# Patient Record
Sex: MALE | Employment: FULL TIME | URBAN - METROPOLITAN AREA
[De-identification: names, ages, dates, MRNs, and addresses within clinical notes are randomized per-mention and may not be internally consistent; named-entity substitution may affect disease eponyms.]

---

## 2019-08-21 ENCOUNTER — OFFICE VISIT (OUTPATIENT)
Dept: ORTHOPEDICS | Facility: CLINIC | Age: 59
End: 2019-08-21
Payer: COMMERCIAL

## 2019-08-21 VITALS
HEART RATE: 51 BPM | BODY MASS INDEX: 30.22 KG/M2 | HEIGHT: 66 IN | SYSTOLIC BLOOD PRESSURE: 138 MMHG | DIASTOLIC BLOOD PRESSURE: 80 MMHG | WEIGHT: 188 LBS

## 2019-08-21 DIAGNOSIS — M51.36 DISC DEGENERATION, LUMBAR: ICD-10-CM

## 2019-08-21 DIAGNOSIS — M47.816 FACET ARTHROPATHY, LUMBAR: ICD-10-CM

## 2019-08-21 DIAGNOSIS — M47.812 SPONDYLOSIS OF CERVICAL REGION WITHOUT MYELOPATHY OR RADICULOPATHY: Primary | ICD-10-CM

## 2019-08-21 DIAGNOSIS — M47.812 FACET ARTHROPATHY, CERVICAL: ICD-10-CM

## 2019-08-21 PROCEDURE — 3008F BODY MASS INDEX DOCD: CPT | Mod: S$GLB,,, | Performed by: ORTHOPAEDIC SURGERY

## 2019-08-21 PROCEDURE — 3008F PR BODY MASS INDEX (BMI) DOCUMENTED: ICD-10-PCS | Mod: S$GLB,,, | Performed by: ORTHOPAEDIC SURGERY

## 2019-08-21 PROCEDURE — 99204 OFFICE O/P NEW MOD 45 MIN: CPT | Mod: S$GLB,,, | Performed by: ORTHOPAEDIC SURGERY

## 2019-08-21 PROCEDURE — 99204 PR OFFICE/OUTPT VISIT, NEW, LEVL IV, 45-59 MIN: ICD-10-PCS | Mod: S$GLB,,, | Performed by: ORTHOPAEDIC SURGERY

## 2019-08-21 RX ORDER — BACLOFEN 10 MG/1
10 TABLET ORAL 3 TIMES DAILY PRN
Refills: 0 | COMMUNITY
Start: 2019-07-24 | End: 2019-08-21

## 2019-08-21 RX ORDER — ROSUVASTATIN CALCIUM 40 MG/1
40 TABLET, COATED ORAL DAILY
Refills: 3 | COMMUNITY
Start: 2019-07-12

## 2019-08-21 RX ORDER — TAMSULOSIN HYDROCHLORIDE 0.4 MG/1
CAPSULE ORAL
Refills: 3 | COMMUNITY
Start: 2019-06-25

## 2019-08-21 RX ORDER — NAPROXEN SODIUM 220 MG/1
TABLET, FILM COATED ORAL
COMMUNITY

## 2019-08-21 NOTE — PROGRESS NOTES
Subjective:       Patient ID: Ernesto Proctor is a 59 y.o. male.    Chief Complaint: Pain of the Neck (Neck pain x years. Pain radiates to shoulders) and Pain of the Lumbar Spine (Lumbar pain x 2 years. Pain does not radiate. Limited standing. He has had MRI of neck and Lumbar done at [facility])      History of Present Illness  Long history of chronic neck and low back pains. Neck pain is present 17 years back pain 2 years no trauma no radiation no bowel or bladder dysfunction symptoms are daily intermittent activity related. He is undergone physical therapy and chiropractic treatments he did have injections on the left side of his low back pain year ago which didn't help incidentally his back pain is primarily right-sided neck pain primarily left-sided.    Current Medications  Current Outpatient Medications   Medication Sig Dispense Refill    aspirin 81 MG Chew Baby Aspirin 81 mg chewable tablet   Chew 1 tablet every day by oral route.      omega 3-dha-epa-fish oil (FISH OIL) 100-160-1,000 mg Cap Fish Oil      rosuvastatin (CRESTOR) 40 MG Tab Take 40 mg by mouth once daily.  3    tamsulosin (FLOMAX) 0.4 mg Cap TAKE 1 CAPSULE BY MOUTH ONCE DAILY AT BEDTIME FOR PROSTATE  3     No current facility-administered medications for this visit.        Allergies  Review of patient's allergies indicates:   Allergen Reactions    Sulfa (sulfonamide antibiotics)        Past Medical History  Past Medical History:   Diagnosis Date    Hyperlipemia        Surgical History  History reviewed. No pertinent surgical history.    Family History:   History reviewed. No pertinent family history.    Social History:   Social History     Socioeconomic History    Marital status:      Spouse name: Not on file    Number of children: Not on file    Years of education: Not on file    Highest education level: Not on file   Occupational History    Not on file   Social Needs    Financial resource  strain: Not on file    Food insecurity:     Worry: Not on file     Inability: Not on file    Transportation needs:     Medical: Not on file     Non-medical: Not on file   Tobacco Use    Smoking status: Former Smoker    Smokeless tobacco: Never Used   Substance and Sexual Activity    Alcohol use: Not on file    Drug use: Not on file    Sexual activity: Not on file   Lifestyle    Physical activity:     Days per week: Not on file     Minutes per session: Not on file    Stress: Not on file   Relationships    Social connections:     Talks on phone: Not on file     Gets together: Not on file     Attends Tenriism service: Not on file     Active member of club or organization: Not on file     Attends meetings of clubs or organizations: Not on file     Relationship status: Not on file   Other Topics Concern    Not on file   Social History Narrative    Not on file       Hospitalization/Major Diagnostic Procedure:     Review of Systems     General/Constitutional:  Chills denies. Fatigue denies. Fever denies. Weight gain denies. Weight loss denies.    Respiratory:  Shortness of breath denies.    Cardiovascular:  Chest pain denies.    Gastrointestinal:  Constipation denies. Diarrhea denies. Nausea denies. Vomiting denies.     Hematology:  Easy bruising denies. Prolonged bleeding denies.     Genitourinary:  Frequent urination denies. Pain in lower back denies. Painful urination denies.     Musculoskeletal:  See HPI for details    Skin:  Rash denies.    Neurologic:  Dizziness denies. Gait abnormalities denies. Seizures denies. Tingling/Numbess denies.    Psychiatric:  Anxiety denies. Depressed mood denies.     Objective:   Vital Signs:   Vitals:    08/21/19 0905   BP: 138/80   Pulse: (!) 51        Physical Exam      General Examination:     Constitutional: The patient is alert and oriented to lace person and time. Mood is pleasant.     Head/Face: Normal facial features normal eyebrows    Eyes: Normal extraocular  motion bilaterally    Lungs: Respirations are equal and unlabored    Gait is coordinated.    Cardiovascular: There are no swelling or varicosities present.    Lymphatic: Negative for adenopathy    Skin: Normal    Neurological: Level of consciousness normal. Oriented to place person and time and situation    Psychiatric: Oriented to time place person and situation    Patient can stand erect has pain when doing so moderate tenderness right posterior iliac spine and sacroiliac jointrange of motion moderately restricted straight leg raising negative Bertha test negative sacroiliac joint provocation test negative. Cervical exam tenderness left cervical paraspinous muscles upper cervical region without spasm range of motion mildly restricted Tarah's maneuver negative reflexes normal hip and knee range of motion intact motor exam normal pulses intact    XRAY Report/ Interpretation : Lumbar MRI study contained on compact disc was personally reviewed this study performed on July 31 was personally reviewed and shows evidence of grade 1 degenerative spondylolisthesis L4-5 foraminal stenosis at L4-5 facet joint arthritis at multiple levels moderate spondylosis and spondylolysis at L5  Cervical MRI films performed July 31 were personally reviewed multilevel disc degenerated condition noted grade 1 spondylolisthesis C5-6 multilevel facet joint arthritis noted      Assessment:       1. Spondylosis of cervical region without myelopathy or radiculopathy    2. Disc degeneration, lumbar    3. Facet arthropathy, lumbar    4. Facet arthropathy, cervical        Plan:       Ernesto was seen today for pain and pain.    Diagnoses and all orders for this visit:    Spondylosis of cervical region without myelopathy or radiculopathy    Disc degeneration, lumbar    Facet arthropathy, lumbar  Comments:  right    Facet arthropathy, cervical  Comments:  left    Other orders  -     Cancel: X-Ray Cervical Spine AP And Lateral  -     Cancel: X-Ray  Lumbar Spine Ap And Lateral  -     Cancel: X-Ray Pelvis Routine AP         No follow-ups on file.    Treatment options were discussed regards to the nature of the spinal condition conservative pain interventional and surgical options were discussed in detail and the probability of success of the separate treatment options was discussed in detail the patient expressed a clear understanding of the treatment options would regards to surgery the procedure risks benefits complications and outcomes were discussed.  No guarantees were given regards to surgical outcome.  I recommend that he consider left-sided cervical facet joint injections and medial branch blocks and right-sided lumbar facet joint injections and medial branch blocks literature was given regards to these studies she certainly has no neurological changes but wants to consider his options and return as needed    This note was created using Dragon voice recognition software that occasionally misinterpreted phrases or words.

## 2019-08-21 NOTE — PATIENT INSTRUCTIONS
[facility]  [facility]      STEP 1: Diagnostic Medial Branch Blocks (Facet Nerve Blocks)    What are medial branch nerves? Why are medial branch blocks helpful?     Medial branch nerves are very small nerve branches that communicate pain caused by the facet joints in the spine. These nerves do not control any muscles or sensation in the arms or legs. They are located along a bony groove in the spine.     If this procedure has been scheduled, there is strong evidence to suspect that the facet joints are the source of your pain. Benefit may be obtained from having these medial branch nerves temporarily blocked with an anesthetic to see if a more permanent way of blocking these nerves would provide pain relief for a longer term. Blocking these medial branch nerves temporarily stops the transmission of pain signals from the facet joints to the brain. If you receive temporary relief of a portion of your pain after these injections you will likely benefit from radiofrequency ablation of the same nerves. Radiofrequency ablation provides the same amount of relief as the diagnostic blocks, but lasts approximately 6-12 months.     What happens during medial branch blocks?    An IV will be started, so that sedation can be given. You will be placed on the x-ray table and positioned in such a way that the physician can best visualize the bony areas where the medial branch nerves pass. The skin is cleansed using sterile scrub (soap). Next the physician numbs a small area of skin with numbing medicine. The medicine stings for several seconds. Using x-ray guidance, your physician directs a small needle, near the specific nerve or nerves being tested. A small mixture of numbing medicine (anesthetic) and anti-inflammatory (cortisone/steroid) is then injected.    What happens after the procedure?  Immediately after the procedure, you will go back to the recovery area where you will be monitored for  30-60 minutes. You will be asked to report the immediate percentage of pain relief and record the relief experienced during that day on a post injection evaluation sheet (pain diary). You must call and set up a follow up appointment for two weeks after the procedure to discuss the results from this block and determine if you will proceed to step 2 of the treatment of your facet nerves.

## 2024-06-29 ENCOUNTER — OFFICE VISIT (OUTPATIENT)
Dept: URGENT CARE | Facility: CLINIC | Age: 64
End: 2024-06-29
Payer: MEDICARE

## 2024-06-29 VITALS
OXYGEN SATURATION: 97 % | DIASTOLIC BLOOD PRESSURE: 70 MMHG | RESPIRATION RATE: 17 BRPM | SYSTOLIC BLOOD PRESSURE: 140 MMHG | HEIGHT: 66 IN | WEIGHT: 185 LBS | HEART RATE: 55 BPM | BODY MASS INDEX: 29.73 KG/M2 | TEMPERATURE: 98 F

## 2024-06-29 DIAGNOSIS — B96.89 ACUTE BACTERIAL SINUSITIS: Primary | ICD-10-CM

## 2024-06-29 DIAGNOSIS — J01.90 ACUTE BACTERIAL SINUSITIS: Primary | ICD-10-CM

## 2024-06-29 DIAGNOSIS — R05.9 COUGH, UNSPECIFIED TYPE: ICD-10-CM

## 2024-06-29 LAB
CTP QC/QA: YES
SARS-COV-2 AG RESP QL IA.RAPID: NEGATIVE

## 2024-06-29 PROCEDURE — 99204 OFFICE O/P NEW MOD 45 MIN: CPT | Mod: S$GLB,,,

## 2024-06-29 PROCEDURE — 87811 SARS-COV-2 COVID19 W/OPTIC: CPT | Mod: QW,S$GLB,,

## 2024-06-29 RX ORDER — LOSARTAN POTASSIUM 50 MG/1
50 TABLET ORAL
COMMUNITY

## 2024-06-29 RX ORDER — MOMETASONE FUROATE 1 MG/G
CREAM TOPICAL
COMMUNITY
Start: 2024-04-24

## 2024-06-29 RX ORDER — PROMETHAZINE HYDROCHLORIDE AND DEXTROMETHORPHAN HYDROBROMIDE 6.25; 15 MG/5ML; MG/5ML
5 SYRUP ORAL
COMMUNITY
Start: 2024-06-21 | End: 2024-07-05

## 2024-06-29 RX ORDER — IBUPROFEN 800 MG/1
800 TABLET ORAL
COMMUNITY
Start: 2024-05-01

## 2024-06-29 RX ORDER — CEFDINIR 300 MG/1
300 CAPSULE ORAL 2 TIMES DAILY
Qty: 20 CAPSULE | Refills: 0 | Status: SHIPPED | OUTPATIENT
Start: 2024-06-29 | End: 2024-07-09

## 2024-06-29 NOTE — PROGRESS NOTES
"Subjective:      Patient ID: Ernesto Proctor is a 64 y.o. male.    Vitals:  height is 5' 6" (1.676 m) and weight is 83.9 kg (185 lb). His oral temperature is 97.9 °F (36.6 °C). His blood pressure is 140/70 (abnormal) and his pulse is 55 (abnormal). His respiration is 17 and oxygen saturation is 97%.     Chief Complaint: Cough (Pt states that his symptoms started on 14 days ago. Patient states that his symptoms are the following: cough w/ mucus, nasal congestion, headache, lightheaded due to cough, chest congestion. Patient states that he has taken the following to treat symptoms: prenisone, doxycycline, mucinex d, cough syrup,ivermectin)    This is a 64 y.o. male who presents today with a chief complaint of Cough: Pt states that his symptoms started on 14 days ago. Patient states that his symptoms are the following: cough w/ mucus, nasal congestion, headache, lightheaded due to cough, chest congestion. Patient states that he has taken the following to treat symptoms: prenisone, doxycycline, mucinex d, cough syrup,ivermectin  Patient presents with:  Cough: Pt states that his symptoms started on 14 days ago. Patient states that his symptoms are the following: cough w/ mucus, nasal congestion, headache, lightheaded due to cough, chest congestion. Patient states that he has taken the following to treat symptoms: prenisone, doxycycline, mucinex d, cough syrup,ivermectin. Patient states that he did not complete his doxy and only took this for 2 days.          Cough  This is a new problem. The current episode started 1 to 4 weeks ago. The problem has been gradually improving. The problem occurs constantly. The cough is Productive of sputum. Associated symptoms include headaches, nasal congestion and postnasal drip. Associated symptoms comments: Chest congestion. Treatments tried: prenisone, doxycycline, mucinex d, cough syrup,ivermectin. The treatment provided mild relief.       Constitution: Negative.   HENT:  Positive " for congestion, postnasal drip, sinus pain and sinus pressure.    Neck: neck negative.   Cardiovascular: Negative.    Eyes: Negative.    Respiratory:  Positive for cough.    Gastrointestinal: Negative.    Endocrine: negative.   Genitourinary: Negative.    Musculoskeletal: Negative.    Skin: Negative.    Allergic/Immunologic: Negative.    Neurological:  Positive for headaches.   Hematologic/Lymphatic: Negative.    Psychiatric/Behavioral: Negative.        Objective:     Physical Exam   Constitutional: He is oriented to person, place, and time.   HENT:   Head: Normocephalic and atraumatic.   Ears:   Right Ear: Tympanic membrane, external ear and ear canal normal.   Left Ear: Tympanic membrane, external ear and ear canal normal.   Nose: Congestion present. Right sinus exhibits maxillary sinus tenderness and frontal sinus tenderness. Left sinus exhibits maxillary sinus tenderness and frontal sinus tenderness.   Mouth/Throat: Posterior oropharyngeal erythema present.   Eyes: Conjunctivae are normal. Pupils are equal, round, and reactive to light. Extraocular movement intact   Neck: Neck supple.   Cardiovascular: Regular rhythm, normal heart sounds and normal pulses. Bradycardia present.   Pulmonary/Chest: Effort normal and breath sounds normal.   Abdominal: Normal appearance.   Musculoskeletal: Normal range of motion.         General: Normal range of motion.   Neurological: no focal deficit. He is alert, oriented to person, place, and time and at baseline.   Skin: Skin is warm.   Psychiatric: His behavior is normal. Mood, judgment and thought content normal.   Nursing note and vitals reviewed.      Assessment:     1. Acute bacterial sinusitis    2. Cough, unspecified type      Results for orders placed or performed in visit on 06/29/24   SARS Coronavirus 2 Antigen, POCT Manual Read   Result Value Ref Range    SARS Coronavirus 2 Antigen Negative Negative     Acceptable Yes       Plan:       Acute bacterial  sinusitis  -     cefdinir (OMNICEF) 300 MG capsule; Take 1 capsule (300 mg total) by mouth 2 (two) times daily. for 10 days  Dispense: 20 capsule; Refill: 0    Cough, unspecified type  -     SARS Coronavirus 2 Antigen, POCT Manual Read

## 2025-01-07 ENCOUNTER — OFFICE VISIT (OUTPATIENT)
Dept: PODIATRY | Facility: CLINIC | Age: 65
End: 2025-01-07
Payer: MEDICARE

## 2025-01-07 VITALS
HEART RATE: 63 BPM | WEIGHT: 179 LBS | HEIGHT: 66 IN | DIASTOLIC BLOOD PRESSURE: 68 MMHG | SYSTOLIC BLOOD PRESSURE: 137 MMHG | BODY MASS INDEX: 28.77 KG/M2

## 2025-01-07 DIAGNOSIS — M20.41 HAMMER TOE OF RIGHT FOOT: ICD-10-CM

## 2025-01-07 DIAGNOSIS — L97.521 ULCER OF BOTH FEET, LIMITED TO BREAKDOWN OF SKIN: Primary | ICD-10-CM

## 2025-01-07 DIAGNOSIS — L97.511 ULCER OF BOTH FEET, LIMITED TO BREAKDOWN OF SKIN: Primary | ICD-10-CM

## 2025-01-07 PROCEDURE — 99203 OFFICE O/P NEW LOW 30 MIN: CPT | Mod: S$PBB,,, | Performed by: PODIATRIST

## 2025-01-07 PROCEDURE — 99214 OFFICE O/P EST MOD 30 MIN: CPT | Mod: PBBFAC,PN | Performed by: PODIATRIST

## 2025-01-07 PROCEDURE — 99999 PR PBB SHADOW E&M-EST. PATIENT-LVL IV: CPT | Mod: PBBFAC,,, | Performed by: PODIATRIST

## 2025-01-08 PROBLEM — M20.41 HAMMER TOE OF RIGHT FOOT: Status: ACTIVE | Noted: 2025-01-08

## 2025-01-08 PROBLEM — L97.511 ULCER OF BOTH FEET, LIMITED TO BREAKDOWN OF SKIN: Status: ACTIVE | Noted: 2025-01-08

## 2025-01-08 PROBLEM — L97.521 ULCER OF BOTH FEET, LIMITED TO BREAKDOWN OF SKIN: Status: ACTIVE | Noted: 2025-01-08

## 2025-01-08 NOTE — PROGRESS NOTES
Subjective:       Patient ID: Ernesto Proctor is a 64 y.o. male.    Chief Complaint: Callouses  Patient presents for a new patient evaluation I had previously seen the patient years ago for a similar problem he states he has been doing absolutely fine for a number of years this recently started to bother him.  Patient is relating pain between the 4th and 5th digits on the right foot he also has a newer area underneath the little toe on the left.  Patient states he did use the toe spacers for an extended period of time that I gave him but ultimately they had worn out and he was not able to find the same type of spacer.    Past Medical History:   Diagnosis Date    Hyperlipemia      History reviewed. No pertinent surgical history.  No family history on file.  Social History     Socioeconomic History    Marital status:    Tobacco Use    Smoking status: Former    Smokeless tobacco: Never       Current Outpatient Medications   Medication Sig Dispense Refill    aspirin 81 MG Chew Baby Aspirin 81 mg chewable tablet   Chew 1 tablet every day by oral route.      ibuprofen (ADVIL,MOTRIN) 800 MG tablet Take 800 mg by mouth.      losartan (COZAAR) 50 MG tablet Take 50 mg by mouth.      mometasone 0.1% (ELOCON) 0.1 % cream SMARTSIG:Sparingly In Ear(s) Once a Week      omega 3-dha-epa-fish oil (FISH OIL) 100-160-1,000 mg Cap Fish Oil      rosuvastatin (CRESTOR) 40 MG Tab Take 40 mg by mouth once daily.  3    tamsulosin (FLOMAX) 0.4 mg Cap TAKE 1 CAPSULE BY MOUTH ONCE DAILY AT BEDTIME FOR PROSTATE  3     No current facility-administered medications for this visit.     Review of patient's allergies indicates:   Allergen Reactions    Latex, natural rubber     Sulfa (sulfonamide antibiotics)        Review of Systems   Musculoskeletal:  Positive for arthralgias.   Skin:  Positive for color change and wound.   All other systems reviewed and are negative.      Objective:      Vitals:    01/07/25 1411   BP: 137/68   BP Location:  "Left forearm   Patient Position: Sitting   Pulse: 63   Weight: 81.2 kg (179 lb)   Height: 5' 6" (1.676 m)     Physical Exam  Vitals and nursing note reviewed. Exam conducted with a chaperone present.   Constitutional:       Appearance: Normal appearance.   Cardiovascular:      Pulses:           Dorsalis pedis pulses are 1+ on the right side and 1+ on the left side.        Posterior tibial pulses are 1+ on the right side and 1+ on the left side.   Pulmonary:      Effort: Pulmonary effort is normal.   Musculoskeletal:         General: Tenderness present.      Right foot: Decreased range of motion. Deformity and prominent metatarsal heads present.      Left foot: Prominent metatarsal heads present.        Feet:    Feet:      Right foot:      Protective Sensation: 2 sites tested.  2 sites sensed.      Skin integrity: Ulcer, skin breakdown, callus and dry skin present.      Left foot:      Protective Sensation: 2 sites tested.  2 sites sensed.      Skin integrity: Ulcer, callus and dry skin present.   Skin:     Capillary Refill: Capillary refill takes 2 to 3 seconds.      Findings: Erythema and lesion present.   Neurological:      General: No focal deficit present.      Mental Status: He is alert.   Psychiatric:         Mood and Affect: Mood normal.         Behavior: Behavior normal.                          Assessment:       1. Ulcer of both feet, limited to breakdown of skin    2. Hammer toe of right foot        Plan:       Patient presents for a new patient evaluation I had previously seen the patient years ago for a similar problem he states he has been doing absolutely fine for a number of years this recently started to bother him.  Patient is relating pain between the 4th and 5th digits on the right foot he also has a newer area underneath the little toe on the left.  Patient states he did use the toe spacers for an extended period of time that I gave him but ultimately they had worn out and he was not able to find " the same type of spacer.  A comprehensive new patient evaluation was performed today patient does have an area of skin breakdown and ulceration in the 4th webspace right patient has a hyperkeratotic pre ulcerative lesion in 2 locations on the medial aspect of the 5th digit right there is also an area on the lateral aspect of the 4th digit right.  I did advised the patient clearly he has some degenerative changes some underlying spurring some digital contractures that is causing rubbing of the digits which is led to skin breakdown callus formation and subsequent discomfort.  Patient has a pre ulcerative area sub 5th metatarsal left that is also causing him discomfort I was able to aggressively debride this non excisionally I have also debrided the lesions in the 4th webspace both on the medial 5th digit lateral 4th digit patient noted immediate relief I have advised the patient needs to use Silipos toe spacers in the 4th webspace every day I have given him additional toe spacers advising him he can purchase these on [employer] I also following debridement applied a horseshoe pad to the plantar aspect sub 5th metatarsal left and advised the patient he needs to keep this well moisturized well hydrated I have recommended a 40% urea cream applied to this daily covered with a bandage to occlude it and I plan to see the patient as needed for follow-up he is going to follow the recommendations that I have provided today.This note was created using [employer] voice recognition software that occasionally misinterpreted phrases or words.